# Patient Record
Sex: FEMALE | Race: WHITE | NOT HISPANIC OR LATINO
[De-identification: names, ages, dates, MRNs, and addresses within clinical notes are randomized per-mention and may not be internally consistent; named-entity substitution may affect disease eponyms.]

---
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
## 2019-01-11 ENCOUNTER — MEDICATION RENEWAL (OUTPATIENT)
Age: 19
End: 2019-01-11

## 2019-03-18 ENCOUNTER — APPOINTMENT (OUTPATIENT)
Dept: ENDOCRINOLOGY | Facility: CLINIC | Age: 19
End: 2019-03-18
Payer: COMMERCIAL

## 2019-03-18 VITALS
HEIGHT: 70 IN | SYSTOLIC BLOOD PRESSURE: 120 MMHG | BODY MASS INDEX: 25.95 KG/M2 | DIASTOLIC BLOOD PRESSURE: 80 MMHG | HEART RATE: 104 BPM | OXYGEN SATURATION: 99 % | WEIGHT: 181.25 LBS

## 2019-03-18 LAB
GLUCOSE BLDC GLUCOMTR-MCNC: 108
HBA1C MFR BLD HPLC: 6.3

## 2019-03-18 PROCEDURE — 83036 HEMOGLOBIN GLYCOSYLATED A1C: CPT | Mod: QW

## 2019-03-18 PROCEDURE — 99214 OFFICE O/P EST MOD 30 MIN: CPT | Mod: 25

## 2019-03-18 PROCEDURE — 82962 GLUCOSE BLOOD TEST: CPT

## 2019-03-18 NOTE — ASSESSMENT
[Carbohydrate Consistent Diet] : carbohydrate consistent diet [Long Term Vascular Complications] : long term vascular complications of diabetes [FreeTextEntry1] : 19 y.o. female with h/o Type 1 DM and hyperlipidemia.\par 1. Type 1 DM- Good control with Hba1c of 6.3%. Encouraged carbohydrate consistent diet and exercise. Reviewed blood glucose log and no patterns noted. Will continue current basal bolus regimen with Lantus 31 units daily and Humalog with ICR of 7 and ISF of 70 with target of 130. Encouraged patient to consider CGMS to help obtain more information and for safety. Normal CMP and urine microalb/cr ratio.\par 2. BP is at goal\par 3. Hyperlipidemia- Encouraged low fat diet and exercise. Will monitor for now. \par \par Follow up in 4 months

## 2019-03-18 NOTE — REVIEW OF SYSTEMS
[Negative] : Endocrine [Irregular Menses] : regular menses [Pain/Numbness of Digits] : no pain/numbness of digits [Swelling] : no swelling

## 2019-03-18 NOTE — PHYSICAL EXAM
[Alert] : alert [No Acute Distress] : no acute distress [Normal Sclera/Conjunctiva] : normal sclera/conjunctiva [EOMI] : extra ocular movement intact [Supple] : the neck was supple [Thyroid Not Enlarged] : the thyroid was not enlarged [No LAD] : no lymphadenopathy [No Accessory Muscle Use] : no accessory muscle use [Clear to Auscultation] : lungs were clear to auscultation bilaterally [Regular Rhythm] : with a regular rhythm [Normal S1, S2] : normal S1 and S2 [No Edema] : there was no peripheral edema [Normal Bowel Sounds] : normal bowel sounds [Not Tender] : non-tender [Soft] : abdomen soft [Not Distended] : not distended [No Clubbing, Cyanosis] : no clubbing  or cyanosis of the fingernails [Right Foot Was Examined] : right foot ~C was examined [No Rash] : no rash [Left Foot Was Examined] : left foot ~C was examined [Normal] : normal [2+] : 2+ in the dorsalis pedis [Normal Reflexes] : deep tendon reflexes were 2+ and symmetric [Normal Affect] : the affect was normal [Normal Mood] : the mood was normal [Diminished Throughout Both Feet] : normal tactile sensation with monofilament testing throughout both feet

## 2019-10-04 ENCOUNTER — MEDICATION RENEWAL (OUTPATIENT)
Age: 19
End: 2019-10-04

## 2019-10-04 ENCOUNTER — APPOINTMENT (OUTPATIENT)
Dept: ENDOCRINOLOGY | Facility: CLINIC | Age: 19
End: 2019-10-04
Payer: COMMERCIAL

## 2019-10-04 VITALS
WEIGHT: 175 LBS | SYSTOLIC BLOOD PRESSURE: 118 MMHG | OXYGEN SATURATION: 99 % | HEIGHT: 70 IN | BODY MASS INDEX: 25.05 KG/M2 | HEART RATE: 120 BPM | DIASTOLIC BLOOD PRESSURE: 74 MMHG

## 2019-10-04 LAB
GLUCOSE BLDC GLUCOMTR-MCNC: 96
HBA1C MFR BLD HPLC: 7.3

## 2019-10-04 PROCEDURE — 99214 OFFICE O/P EST MOD 30 MIN: CPT | Mod: 25

## 2019-10-04 PROCEDURE — 82962 GLUCOSE BLOOD TEST: CPT

## 2019-10-04 PROCEDURE — 83036 HEMOGLOBIN GLYCOSYLATED A1C: CPT | Mod: QW

## 2019-10-04 RX ORDER — NORGESTIMATE AND ETHINYL ESTRADIOL 7DAYSX3 LO
0.18/0.215/0.25 KIT ORAL
Qty: 28 | Refills: 0 | Status: DISCONTINUED | COMMUNITY
Start: 2017-04-18 | End: 2019-10-04

## 2019-10-04 RX ORDER — BLOOD-GLUCOSE TRANSMITTER
EACH MISCELLANEOUS
Qty: 1 | Refills: 1 | Status: ACTIVE | COMMUNITY
Start: 2019-10-04

## 2019-10-04 RX ORDER — BLOOD-GLUCOSE SENSOR
EACH MISCELLANEOUS
Qty: 3 | Refills: 3 | Status: ACTIVE | COMMUNITY
Start: 2019-10-04

## 2019-10-04 NOTE — ASSESSMENT
[FreeTextEntry1] : 19 y.o. female with h/o Type 1 DM and hyperlipidemia.\par 1. Type 1 DM- Good control with Hba1c of 7.3%. Encouraged carbohydrate consistent diet and exercise. Reviewed blood glucose log. Will decrease Lantus to 30 units daily and will continue Humalog with ICR of 7 and ISF of 70 with target of 130. Will order CGMS to help obtain more information and for safety. Will check CMP and urine microalb/cr ratio.\par 2. BP is at goal\par 3. Hyperlipidemia- Encouraged low fat diet and exercise. Will monitor for now. Will check lipids. \par \par Follow up in 3 to 4 months [Carbohydrate Consistent Diet] : carbohydrate consistent diet [Long Term Vascular Complications] : long term vascular complications of diabetes

## 2019-10-04 NOTE — PHYSICAL EXAM
[Alert] : alert [Normal Sclera/Conjunctiva] : normal sclera/conjunctiva [No Acute Distress] : no acute distress [EOMI] : extra ocular movement intact [Supple] : the neck was supple [Thyroid Not Enlarged] : the thyroid was not enlarged [No LAD] : no lymphadenopathy [No Accessory Muscle Use] : no accessory muscle use [Clear to Auscultation] : lungs were clear to auscultation bilaterally [Normal S1, S2] : normal S1 and S2 [Regular Rhythm] : with a regular rhythm [No Edema] : there was no peripheral edema [Not Tender] : non-tender [Normal Bowel Sounds] : normal bowel sounds [Soft] : abdomen soft [Not Distended] : not distended [No Rash] : no rash [No Clubbing, Cyanosis] : no clubbing  or cyanosis of the fingernails [Right Foot Was Examined] : right foot ~C was examined [Left Foot Was Examined] : left foot ~C was examined [Normal] : normal [Diminished Throughout Both Feet] : normal tactile sensation with monofilament testing throughout both feet [2+] : 2+ in the dorsalis pedis [Normal Affect] : the affect was normal [Normal Reflexes] : deep tendon reflexes were 2+ and symmetric [Normal Mood] : the mood was normal

## 2019-10-04 NOTE — REVIEW OF SYSTEMS
[Irregular Menses] : regular menses [Joint Pain] : joint pain [Pain/Numbness of Digits] : no pain/numbness of digits [Headache] : headaches [Swelling] : no swelling [Negative] : Psychiatric [FreeTextEntry9] : left knee pain

## 2019-10-19 LAB
25(OH)D3 SERPL-MCNC: 37.3 NG/ML
ALBUMIN SERPL ELPH-MCNC: 4.6 G/DL
ALP BLD-CCNC: 72 U/L
ALT SERPL-CCNC: 8 U/L
ANION GAP SERPL CALC-SCNC: 12 MMOL/L
AST SERPL-CCNC: 11 U/L
BASOPHILS # BLD AUTO: 0.06 K/UL
BASOPHILS NFR BLD AUTO: 0.8 %
BILIRUB SERPL-MCNC: 0.3 MG/DL
BUN SERPL-MCNC: 10 MG/DL
CALCIUM SERPL-MCNC: 9.9 MG/DL
CHLORIDE SERPL-SCNC: 106 MMOL/L
CHOLEST SERPL-MCNC: 176 MG/DL
CHOLEST/HDLC SERPL: 4.6 RATIO
CO2 SERPL-SCNC: 24 MMOL/L
CREAT SERPL-MCNC: 0.65 MG/DL
CREAT SPEC-SCNC: 292 MG/DL
EOSINOPHIL # BLD AUTO: 0.13 K/UL
EOSINOPHIL NFR BLD AUTO: 1.7 %
GLUCOSE SERPL-MCNC: 115 MG/DL
HCT VFR BLD CALC: 42.2 %
HDLC SERPL-MCNC: 38 MG/DL
HGB BLD-MCNC: 13.8 G/DL
IMM GRANULOCYTES NFR BLD AUTO: 0.3 %
LDLC SERPL CALC-MCNC: 112 MG/DL
LYMPHOCYTES # BLD AUTO: 2.54 K/UL
LYMPHOCYTES NFR BLD AUTO: 33 %
MAN DIFF?: NORMAL
MCHC RBC-ENTMCNC: 30.4 PG
MCHC RBC-ENTMCNC: 32.7 GM/DL
MCV RBC AUTO: 93 FL
MICROALBUMIN 24H UR DL<=1MG/L-MCNC: <1.2 MG/DL
MICROALBUMIN/CREAT 24H UR-RTO: NORMAL MG/G
MONOCYTES # BLD AUTO: 0.51 K/UL
MONOCYTES NFR BLD AUTO: 6.6 %
NEUTROPHILS # BLD AUTO: 4.44 K/UL
NEUTROPHILS NFR BLD AUTO: 57.6 %
PLATELET # BLD AUTO: 276 K/UL
POTASSIUM SERPL-SCNC: 4.1 MMOL/L
PROT SERPL-MCNC: 7.2 G/DL
RBC # BLD: 4.54 M/UL
RBC # FLD: 12.7 %
SODIUM SERPL-SCNC: 142 MMOL/L
T4 FREE SERPL-MCNC: 1.2 NG/DL
TRIGL SERPL-MCNC: 132 MG/DL
TSH SERPL-ACNC: 2.56 UIU/ML
WBC # FLD AUTO: 7.7 K/UL

## 2020-01-31 ENCOUNTER — RX RENEWAL (OUTPATIENT)
Age: 20
End: 2020-01-31

## 2020-02-28 ENCOUNTER — APPOINTMENT (OUTPATIENT)
Dept: ENDOCRINOLOGY | Facility: CLINIC | Age: 20
End: 2020-02-28

## 2020-06-24 ENCOUNTER — APPOINTMENT (OUTPATIENT)
Dept: ENDOCRINOLOGY | Facility: CLINIC | Age: 20
End: 2020-06-24
Payer: COMMERCIAL

## 2020-06-24 VITALS
OXYGEN SATURATION: 99 % | SYSTOLIC BLOOD PRESSURE: 120 MMHG | HEIGHT: 70 IN | TEMPERATURE: 98.3 F | BODY MASS INDEX: 25.48 KG/M2 | WEIGHT: 178 LBS | DIASTOLIC BLOOD PRESSURE: 80 MMHG | HEART RATE: 111 BPM

## 2020-06-24 LAB
GLUCOSE BLDC GLUCOMTR-MCNC: 123
HBA1C MFR BLD HPLC: 6.4

## 2020-06-24 PROCEDURE — 83036 HEMOGLOBIN GLYCOSYLATED A1C: CPT | Mod: QW

## 2020-06-24 PROCEDURE — 82962 GLUCOSE BLOOD TEST: CPT

## 2020-06-24 PROCEDURE — 99214 OFFICE O/P EST MOD 30 MIN: CPT

## 2020-06-24 NOTE — ASSESSMENT
[FreeTextEntry1] : 20 y.o. female with h/o Type 1 DM and hyperlipidemia.\par 1. Type 1 DM- Good control with Hba1c of 6.4% today. Encouraged carbohydrate consistent diet and exercise. Reviewed blood glucose log. Will continue Lantus 30 units daily and will continue Humalog with ICR of 7 and ISF of 70 with target of 130. Encouraged use of CGMS to help obtain more information and for safety. Normal CMP and urine microalb/cr ratio.\par 2. BP is at goal\par 3. Hyperlipidemia- Encouraged low fat diet and exercise. Will monitor for now.\par \par Follow up in 3 to 4 months [Carbohydrate Consistent Diet] : carbohydrate consistent diet [Long Term Vascular Complications] : long term vascular complications of diabetes

## 2020-06-24 NOTE — PHYSICAL EXAM
[Alert] : alert [Normal Sclera/Conjunctiva] : normal sclera/conjunctiva [No Acute Distress] : no acute distress [No LAD] : no lymphadenopathy [EOMI] : extra ocular movement intact [No Respiratory Distress] : no respiratory distress [Thyroid Not Enlarged] : the thyroid was not enlarged [No Thyroid Nodules] : no palpable thyroid nodules [Normal S1, S2] : normal S1 and S2 [Clear to Auscultation] : lungs were clear to auscultation bilaterally [Regular Rhythm] : with a regular rhythm [No Edema] : no peripheral edema [Normal Bowel Sounds] : normal bowel sounds [Not Tender] : non-tender [Not Distended] : not distended [Soft] : abdomen soft [Normal Anterior Cervical Nodes] : no anterior cervical lymphadenopathy [No Clubbing, Cyanosis] : no clubbing  or cyanosis of the fingernails [No Rash] : no rash [Right Foot Was Examined] : right foot ~C was examined [Left Foot Was Examined] : left foot ~C was examined [2+] : 2+ in the dorsalis pedis [Normal] : normal [Diminished Throughout Both Feet] : normal tactile sensation with monofilament testing throughout both feet [Normal Mood] : the mood was normal [Normal Affect] : the affect was normal [Normal Reflexes] : deep tendon reflexes were 2+ and symmetric

## 2020-06-24 NOTE — REVIEW OF SYSTEMS
[Recent Weight Gain (___ Lbs)] : no recent weight gain [Recent Weight Loss (___ Lbs)] : no recent weight loss [Fatigue] : no fatigue [Heartburn] : heartburn [Polyuria] : no polyuria [Irregular Menses] : regular menses [Polydipsia] : no polydipsia [Pain/Numbness of Digits] : no pain/numbness of digits [Swelling] : no swelling [Negative] : Integumentary

## 2020-07-22 ENCOUNTER — APPOINTMENT (OUTPATIENT)
Dept: OPHTHALMOLOGY | Facility: CLINIC | Age: 20
End: 2020-07-22
Payer: COMMERCIAL

## 2020-07-22 ENCOUNTER — NON-APPOINTMENT (OUTPATIENT)
Age: 20
End: 2020-07-22

## 2020-07-22 PROCEDURE — 92014 COMPRE OPH EXAM EST PT 1/>: CPT

## 2020-10-16 ENCOUNTER — APPOINTMENT (OUTPATIENT)
Dept: ENDOCRINOLOGY | Facility: CLINIC | Age: 20
End: 2020-10-16

## 2021-03-19 ENCOUNTER — APPOINTMENT (OUTPATIENT)
Dept: ENDOCRINOLOGY | Facility: CLINIC | Age: 21
End: 2021-03-19
Payer: COMMERCIAL

## 2021-03-19 VITALS
BODY MASS INDEX: 25.77 KG/M2 | HEART RATE: 119 BPM | SYSTOLIC BLOOD PRESSURE: 130 MMHG | WEIGHT: 180 LBS | TEMPERATURE: 96.5 F | HEIGHT: 70 IN | OXYGEN SATURATION: 98 % | DIASTOLIC BLOOD PRESSURE: 90 MMHG

## 2021-03-19 LAB
GLUCOSE BLDC GLUCOMTR-MCNC: 71
HBA1C MFR BLD HPLC: 5.6

## 2021-03-19 PROCEDURE — 83036 HEMOGLOBIN GLYCOSYLATED A1C: CPT | Mod: QW

## 2021-03-19 PROCEDURE — 99072 ADDL SUPL MATRL&STAF TM PHE: CPT

## 2021-03-19 PROCEDURE — 99214 OFFICE O/P EST MOD 30 MIN: CPT | Mod: 25

## 2021-03-19 PROCEDURE — 82962 GLUCOSE BLOOD TEST: CPT

## 2021-03-19 RX ORDER — LEVOTHYROXINE SODIUM 0.05 MG/1
50 TABLET ORAL DAILY
Qty: 90 | Refills: 3 | Status: ACTIVE | COMMUNITY
Start: 2021-03-19

## 2021-03-21 NOTE — ASSESSMENT
[Carbohydrate Consistent Diet] : carbohydrate consistent diet [Long Term Vascular Complications] : long term vascular complications of diabetes [FreeTextEntry1] : 21 y.o. female with h/o Type 1 DM, hyperlipidemia and hypothyroidism.\par \par 1. Type 1 DM- Good control with Hba1c of 5.6% today. However need to avoid hypoglycemia. Encouraged carbohydrate consistent diet and exercise. Reviewed blood glucose log. Will continue Lantus 31 units daily and will continue Humalog with ICR of 7 and ISF of 70 with target of 130. Encouraged use of CGMS to help obtain more information and for safety. WIll check CMP and urine microalb/cr ratio.\par \par 2. BP is at goal\par \par 3. Hyperlipidemia- Encouraged low fat diet and exercise. Will monitor for now. Will check lipids.\par \par 4. Hypothyroidism- Discussed pathophysiology. Will check TFTs and antithyroid antibodies. Will adjust LT4 accordingly.\par \par Follow up in 3 to 4 months [Levothyroxine] : The patient was instructed to take Levothyroxine on an empty stomach, separate from vitamins, and wait at least 30 minutes before eating

## 2021-03-21 NOTE — REVIEW OF SYSTEMS
[Fatigue] : no fatigue [Recent Weight Gain (___ Lbs)] : no recent weight gain [Recent Weight Loss (___ Lbs)] : no recent weight loss [Polyuria] : no polyuria [Irregular Menses] : regular menses [Pain/Numbness of Digits] : no pain/numbness of digits [Polydipsia] : no polydipsia [Swelling] : no swelling [Negative] : Psychiatric

## 2021-03-21 NOTE — PHYSICAL EXAM
[Alert] : alert [No Acute Distress] : no acute distress [Normal Sclera/Conjunctiva] : normal sclera/conjunctiva [EOMI] : extra ocular movement intact [No LAD] : no lymphadenopathy [Thyroid Not Enlarged] : the thyroid was not enlarged [No Thyroid Nodules] : no palpable thyroid nodules [No Respiratory Distress] : no respiratory distress [Clear to Auscultation] : lungs were clear to auscultation bilaterally [Normal S1, S2] : normal S1 and S2 [Regular Rhythm] : with a regular rhythm [No Edema] : no peripheral edema [Normal Bowel Sounds] : normal bowel sounds [Not Tender] : non-tender [Not Distended] : not distended [Soft] : abdomen soft [Normal Anterior Cervical Nodes] : no anterior cervical lymphadenopathy [No Clubbing, Cyanosis] : no clubbing  or cyanosis of the fingernails [No Rash] : no rash [Right Foot Was Examined] : right foot ~C was examined [Left Foot Was Examined] : left foot ~C was examined [Normal] : normal [2+] : 2+ in the dorsalis pedis [Normal Reflexes] : deep tendon reflexes were 2+ and symmetric [Normal Affect] : the affect was normal [Normal Mood] : the mood was normal [Diminished Throughout Both Feet] : normal tactile sensation with monofilament testing throughout both feet

## 2021-09-24 ENCOUNTER — TRANSCRIPTION ENCOUNTER (OUTPATIENT)
Age: 21
End: 2021-09-24

## 2021-09-24 ENCOUNTER — APPOINTMENT (OUTPATIENT)
Dept: OPHTHALMOLOGY | Facility: CLINIC | Age: 21
End: 2021-09-24
Payer: COMMERCIAL

## 2021-09-24 ENCOUNTER — NON-APPOINTMENT (OUTPATIENT)
Age: 21
End: 2021-09-24

## 2021-09-24 PROCEDURE — 92014 COMPRE OPH EXAM EST PT 1/>: CPT

## 2021-12-22 ENCOUNTER — NON-APPOINTMENT (OUTPATIENT)
Age: 21
End: 2021-12-22

## 2021-12-22 ENCOUNTER — APPOINTMENT (OUTPATIENT)
Dept: OPHTHALMOLOGY | Facility: CLINIC | Age: 21
End: 2021-12-22
Payer: COMMERCIAL

## 2021-12-22 PROCEDURE — 92012 INTRM OPH EXAM EST PATIENT: CPT

## 2021-12-22 PROCEDURE — 92083 EXTENDED VISUAL FIELD XM: CPT

## 2021-12-22 PROCEDURE — 92133 CPTRZD OPH DX IMG PST SGM ON: CPT

## 2022-02-07 ENCOUNTER — APPOINTMENT (OUTPATIENT)
Dept: ENDOCRINOLOGY | Facility: CLINIC | Age: 22
End: 2022-02-07
Payer: COMMERCIAL

## 2022-02-07 VITALS — DIASTOLIC BLOOD PRESSURE: 70 MMHG | SYSTOLIC BLOOD PRESSURE: 120 MMHG

## 2022-02-07 VITALS
BODY MASS INDEX: 22.62 KG/M2 | SYSTOLIC BLOOD PRESSURE: 148 MMHG | WEIGHT: 158 LBS | HEART RATE: 93 BPM | DIASTOLIC BLOOD PRESSURE: 83 MMHG | TEMPERATURE: 98.2 F | OXYGEN SATURATION: 100 % | HEIGHT: 70 IN

## 2022-02-07 DIAGNOSIS — Z87.39 PERSONAL HISTORY OF OTHER DISEASES OF THE MUSCULOSKELETAL SYSTEM AND CONNECTIVE TISSUE: ICD-10-CM

## 2022-02-07 DIAGNOSIS — N92.6 IRREGULAR MENSTRUATION, UNSPECIFIED: ICD-10-CM

## 2022-02-07 DIAGNOSIS — E78.5 HYPERLIPIDEMIA, UNSPECIFIED: ICD-10-CM

## 2022-02-07 LAB — HBA1C MFR BLD HPLC: 6.8

## 2022-02-07 PROCEDURE — 36415 COLL VENOUS BLD VENIPUNCTURE: CPT

## 2022-02-07 PROCEDURE — 83036 HEMOGLOBIN GLYCOSYLATED A1C: CPT | Mod: QW

## 2022-02-07 PROCEDURE — 99214 OFFICE O/P EST MOD 30 MIN: CPT | Mod: 25

## 2022-02-07 RX ORDER — CEFADROXIL 500 MG/1
500 CAPSULE ORAL
Qty: 10 | Refills: 0 | Status: DISCONTINUED | COMMUNITY
Start: 2022-01-27

## 2022-02-07 RX ORDER — NORETHINDRONE ACETATE AND ETHINYL ESTRADIOL, ETHINYL ESTRADIOL AND FERROUS FUMARATE 1MG-10(24)
1 MG-10 MCG / KIT ORAL
Refills: 0 | Status: DISCONTINUED | COMMUNITY
Start: 2019-10-04 | End: 2022-02-07

## 2022-02-07 RX ORDER — SCOPOLAMINE 1.5 MG/1
1 PATCH, EXTENDED RELEASE TRANSDERMAL
Qty: 1 | Refills: 0 | Status: DISCONTINUED | COMMUNITY
Start: 2022-01-27

## 2022-02-07 RX ORDER — METHOCARBAMOL 750 MG/1
750 TABLET, FILM COATED ORAL
Qty: 20 | Refills: 0 | Status: DISCONTINUED | COMMUNITY
Start: 2021-11-17

## 2022-02-07 RX ORDER — OXYCODONE HYDROCHLORIDE 5 MG/1
5 CAPSULE ORAL
Qty: 30 | Refills: 0 | Status: DISCONTINUED | COMMUNITY
Start: 2022-01-27

## 2022-02-07 RX ORDER — OXYCODONE 5 MG/1
5 TABLET ORAL
Qty: 24 | Refills: 0 | Status: DISCONTINUED | COMMUNITY
Start: 2022-01-18

## 2022-02-07 RX ORDER — DICLOFENAC SODIUM 100 MG/1
100 TABLET, FILM COATED, EXTENDED RELEASE ORAL
Qty: 30 | Refills: 0 | Status: DISCONTINUED | COMMUNITY
Start: 2021-11-17

## 2022-02-07 NOTE — REVIEW OF SYSTEMS
[Recent Weight Loss (___ Lbs)] : recent weight loss: [unfilled] lbs [Negative] : Psychiatric [Hirsutism] : hirsutism [Fatigue] : no fatigue [Recent Weight Gain (___ Lbs)] : no recent weight gain [Polyuria] : no polyuria [Irregular Menses] : regular menses [Pain/Numbness of Digits] : no pain/numbness of digits [Polydipsia] : no polydipsia [Swelling] : no swelling

## 2022-02-07 NOTE — ASSESSMENT
[Carbohydrate Consistent Diet] : carbohydrate consistent diet [Long Term Vascular Complications] : long term vascular complications of diabetes [Levothyroxine] : The patient was instructed to take Levothyroxine on an empty stomach, separate from vitamins, and wait at least 30 minutes before eating [FreeTextEntry1] : 21 y.o. female with h/o Type 1 DM, hyperlipidemia and hypothyroidism.\par \par 1. Type 1 DM- Good control with Hba1c of 6.8% today. However need to avoid hypoglycemia. Encouraged carbohydrate consistent diet and exercise. Reviewed blood glucose log. Will continue Lantus/Basglar 32 units daily and will continue Humalog with ICR of 7 and ISF of 70 with target of 130. Encouraged use of CGMS to help obtain more information and for safety. WIll check CMP and urine alb/cr ratio.\par \par 2. BP is at goal\par \par 3. Hyperlipidemia- Encouraged low fat diet and exercise. Will monitor for now. Will check lipids.\par \par 4. Hypothyroidism- Discussed pathophysiology. Will check TFTs and antithyroid antibodies. Will adjust LT4 accordingly.\par \par 5. Irregular menses- Will evaluate for PCOS given hirsutism. Will check serum prolactin, LH/FSH, estradiol, testosterone and DHEAS. Agree with GYN follow up\par \par Follow up in 3 to 4 months\par Labs drawn in the office today

## 2022-02-08 ENCOUNTER — APPOINTMENT (OUTPATIENT)
Dept: OPHTHALMOLOGY | Facility: CLINIC | Age: 22
End: 2022-02-08
Payer: COMMERCIAL

## 2022-02-08 ENCOUNTER — NON-APPOINTMENT (OUTPATIENT)
Age: 22
End: 2022-02-08

## 2022-02-08 PROCEDURE — 92133 CPTRZD OPH DX IMG PST SGM ON: CPT

## 2022-02-08 PROCEDURE — 92012 INTRM OPH EXAM EST PATIENT: CPT

## 2022-02-08 PROCEDURE — 92083 EXTENDED VISUAL FIELD XM: CPT

## 2022-02-09 LAB
25(OH)D3 SERPL-MCNC: 41.3 NG/ML
ALBUMIN SERPL ELPH-MCNC: 4.6 G/DL
ALP BLD-CCNC: 85 U/L
ALT SERPL-CCNC: 32 U/L
ANION GAP SERPL CALC-SCNC: 12 MMOL/L
AST SERPL-CCNC: 13 U/L
BASOPHILS # BLD AUTO: 0.06 K/UL
BASOPHILS NFR BLD AUTO: 0.8 %
BILIRUB SERPL-MCNC: 0.3 MG/DL
BUN SERPL-MCNC: 10 MG/DL
CALCIUM SERPL-MCNC: 9.9 MG/DL
CHLORIDE SERPL-SCNC: 103 MMOL/L
CHOLEST SERPL-MCNC: 192 MG/DL
CO2 SERPL-SCNC: 27 MMOL/L
CREAT SERPL-MCNC: 0.52 MG/DL
CREAT SPEC-SCNC: 19 MG/DL
DHEA-S SERPL-MCNC: 454 UG/DL
EOSINOPHIL # BLD AUTO: 0.08 K/UL
EOSINOPHIL NFR BLD AUTO: 1 %
ESTRADIOL SERPL-MCNC: 40 PG/ML
FSH SERPL-MCNC: 7.4 IU/L
GLUCOSE SERPL-MCNC: 134 MG/DL
HCT VFR BLD CALC: 39.6 %
HDLC SERPL-MCNC: 48 MG/DL
HGB BLD-MCNC: 13 G/DL
IMM GRANULOCYTES NFR BLD AUTO: 0.6 %
LDLC SERPL CALC-MCNC: 132 MG/DL
LH SERPL-ACNC: 17.3 IU/L
LYMPHOCYTES # BLD AUTO: 2.8 K/UL
LYMPHOCYTES NFR BLD AUTO: 35.2 %
MAN DIFF?: NORMAL
MCHC RBC-ENTMCNC: 30 PG
MCHC RBC-ENTMCNC: 32.8 GM/DL
MCV RBC AUTO: 91.2 FL
MICROALBUMIN 24H UR DL<=1MG/L-MCNC: <1.2 MG/DL
MICROALBUMIN/CREAT 24H UR-RTO: NORMAL MG/G
MONOCYTES # BLD AUTO: 0.49 K/UL
MONOCYTES NFR BLD AUTO: 6.2 %
NEUTROPHILS # BLD AUTO: 4.47 K/UL
NEUTROPHILS NFR BLD AUTO: 56.2 %
NONHDLC SERPL-MCNC: 144 MG/DL
PLATELET # BLD AUTO: 346 K/UL
POTASSIUM SERPL-SCNC: 4.3 MMOL/L
PROLACTIN SERPL-MCNC: 16.9 NG/ML
PROT SERPL-MCNC: 7.1 G/DL
RBC # BLD: 4.34 M/UL
RBC # FLD: 12.6 %
SODIUM SERPL-SCNC: 142 MMOL/L
T4 FREE SERPL-MCNC: 1.2 NG/DL
TESTOST SERPL-MCNC: 57.7 NG/DL
THYROGLOB AB SERPL-ACNC: <20 IU/ML
THYROPEROXIDASE AB SERPL IA-ACNC: 75.3 IU/ML
TRIGL SERPL-MCNC: 60 MG/DL
TSH SERPL-ACNC: 1.21 UIU/ML
TTG IGA SER IA-ACNC: <1.2 U/ML
TTG IGA SER-ACNC: NEGATIVE
TTG IGG SER IA-ACNC: <1.2 U/ML
TTG IGG SER IA-ACNC: NEGATIVE
WBC # FLD AUTO: 7.95 K/UL

## 2022-03-30 ENCOUNTER — RX RENEWAL (OUTPATIENT)
Age: 22
End: 2022-03-30

## 2022-07-15 ENCOUNTER — APPOINTMENT (OUTPATIENT)
Dept: ENDOCRINOLOGY | Facility: CLINIC | Age: 22
End: 2022-07-15

## 2022-10-31 ENCOUNTER — APPOINTMENT (OUTPATIENT)
Dept: ENDOCRINOLOGY | Facility: CLINIC | Age: 22
End: 2022-10-31

## 2023-05-10 ENCOUNTER — APPOINTMENT (OUTPATIENT)
Dept: ENDOCRINOLOGY | Facility: CLINIC | Age: 23
End: 2023-05-10
Payer: COMMERCIAL

## 2023-05-10 VITALS
SYSTOLIC BLOOD PRESSURE: 132 MMHG | BODY MASS INDEX: 23.48 KG/M2 | OXYGEN SATURATION: 99 % | HEIGHT: 70 IN | DIASTOLIC BLOOD PRESSURE: 80 MMHG | WEIGHT: 164 LBS | HEART RATE: 115 BPM

## 2023-05-10 DIAGNOSIS — C73 MALIGNANT NEOPLASM OF THYROID GLAND: ICD-10-CM

## 2023-05-10 DIAGNOSIS — E03.9 HYPOTHYROIDISM, UNSPECIFIED: ICD-10-CM

## 2023-05-10 DIAGNOSIS — E10.9 TYPE 1 DIABETES MELLITUS W/OUT COMPLICATIONS: ICD-10-CM

## 2023-05-10 PROCEDURE — 99214 OFFICE O/P EST MOD 30 MIN: CPT

## 2023-05-10 RX ORDER — INSULIN GLARGINE 300 U/ML
300 INJECTION, SOLUTION SUBCUTANEOUS
Qty: 3 | Refills: 3 | Status: ACTIVE | COMMUNITY
Start: 2022-03-14 | End: 1900-01-01

## 2023-05-10 RX ORDER — INSULIN GLARGINE 100 [IU]/ML
100 INJECTION, SOLUTION SUBCUTANEOUS
Qty: 30 | Refills: 10 | Status: DISCONTINUED | COMMUNITY
Start: 2020-01-31 | End: 2023-05-10

## 2023-05-10 NOTE — REVIEW OF SYSTEMS
[Recent Weight Loss (___ Lbs)] : recent weight loss: [unfilled] lbs [As Noted in HPI] : as noted in HPI [Irregular Menses] : irregular menses [Negative] : Heme/Lymph [Fatigue] : no fatigue [Recent Weight Gain (___ Lbs)] : no recent weight gain [Eye Pain] : no pain [Blurred Vision] : no blurred vision [Dysphagia] : no dysphagia [Neck Pain] : no neck pain [Dysphonia] : no dysphonia [Chest Pain] : no chest pain [Palpitations] : no palpitations [Shortness Of Breath] : no shortness of breath [Nausea] : no nausea [Constipation] : no constipation [Abdominal Pain] : no abdominal pain [Vomiting] : no vomiting [Polyuria] : no polyuria [Tremors] : no tremors [Pain/Numbness of Digits] : no pain/numbness of digits [Polydipsia] : no polydipsia [Cold Intolerance] : no cold intolerance [Heat Intolerance] : no heat intolerance [Swelling] : no swelling

## 2023-05-10 NOTE — HISTORY OF PRESENT ILLNESS
[FreeTextEntry1] : 23 y.o. female with h/o Type 1 DM diagnosed in 2006 here for follow up visit. Last seen by Dr. Godinez 2/2022. 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 Was recently diagnosed with papillary thyroid cancer while in [place]. States she was having some throat discomfort, went to endocrinologist who found multiple thyroid nodules. A FNA biopsy was performed which showed papillary thyroid cancer, had right thyroidectomy 3/30/2023 and removal of 4 lymph nodes (states had mets to 2 lymph nodes). Surgery was performed in [place]. Had appointment with [facility] (Dr. Lizeth Kramer) and was advised to follow up at [facility] in 1 year. She is returning back to [place] in 8 days, she has follows up set up with endocrinology in [place]. Has scheduled repeat thyroid US in 2 months. Currently she denies any compressive neck symptoms: no neck swelling, neck pain, dysphagia, or shortness of breath.\par \par Hx of Hashimoto's, diagnosed with hypothyroidism in 11/2020 after seeing PCP for weight gain, constipation and hair loss. Currently taking LT4 50 mcg every morning on an empty stomach and waits an hour before eating. Reports she had gained weight prior to her thyroid surgery, now actively trying to lose weight, lost 8 pounds in last 3 months by walking daily and eating healthier. Denies increased fatigue. Denies heat/cold intolerance. Denies constipation or diarrhea. Denies palpitations, restlessness, or increased anxiety. States she has had irregular menses for many years, will go few months without her menstrual cycle. Recently saw [facility], started junel fe birth control.\par \par Had blood work done yesterday: \par TSH: 3.51, total T4: 10.8, and T3: 1.5\par A1c: 6.8%\par \par Type 1 DM hx: \par Did use CSII 6 to 7 years ago and not happy and using MDI now. Has been using Dexcom G6 on and off because of skin irritation and able to FS monitoring. Checks FS 5 to 6 times per day. Taking Toujeo 28 (was previously taking 32 units at night but was having AM hypoglycemia so reduced Toujeo to 28 units) and using Humalog with ICR of 7 with ISF of 70 and target of 130. No major hypoglycemia. Reports she has ketone strips, needs glucagon refill. Has medic ID but not wearing it currently. Also needing refill on her Dexcom supplies. Last saw optho 8/2022, denies retinopathy. Denies neuropathy or active issues with her feet. No polyuria and no polydipsia. No proteinuria on last check. \par \par No DKA since last visit. Uses urine ketone strips if needed. Walks daily for exercise. Did lose weight and modified diet. \par

## 2023-05-10 NOTE — ASSESSMENT
[Carbohydrate Consistent Diet] : carbohydrate consistent diet [Long Term Vascular Complications] : long term vascular complications of diabetes [Levothyroxine] : The patient was instructed to take Levothyroxine on an empty stomach, separate from vitamins, and wait at least 30 minutes before eating [FreeTextEntry1] : 23 y.o. female with h/o Type 1 DM diagnosed in 2006 here for follow up visit\par \par Type 1 DM\par Hba1c of 6.8% (obtained 5/9/2023 with PCP), Encouraged carbohydrate consistent diet and exercise. Reviewed blood glucose log. BG data variable. Will continue Toujeo 28 units daily and will continue Humalog with ICR of 7 and ISF of 70 with target of 130. Encouraged use of CGMS to help obtain more information and for safety. Keep log of BG and food/carb intake, email to our office in 2 weeks. Had recent blood work and urine albumin/creatinine ratio done with PCP, will attempt to obtain. \par \par Thyroid cancer and Hypothyroidism \par Recently diagnosed with papillary thyroid cancer while in [facility] s/p right thyroidectomy 3/30/2023 and removal of 4 lymph nodes (states had mets to 2 lymph nodes). Had recent appointment with [facility] (Dr. Lizeth Kramer). She is returning back to [place] in a few days, she has follow ups set up with endocrinology in [place]. 5/9/2023 TSH: 3.51, total T4: 10.8, and T3: 1.5. Discussed case and TFT's with Dr. Godinez, will likely need more TSH suppression with increase dose of Levothyroxine, however, given patient returning to [place] in few days, recommend patient to follow up with established endocrinology team there and discuss results and further recommendations regarding TSH goals and Levothyroxine dose. Currently do not have access to pathology results and follow up notes, will attempt to obtain from [facility] (patient states she provided [facility] with US, FNA, and pathology results). \par \par Hyperlipidemia\par Encouraged low fat diet and exercise. Will monitor for now. Had recent blood work with PCP performed, will attempt to obtain \par \par Irregular menses\par 2/2022 consistent with PCOS. Saw GYN was started on Junel Fe.\par \par Recommend 3 month follow up, states she will be in [place], has follow up with Dr. Godinez 12/2023. Call office with any concerns. \par \par Indigo Sotomayor PA-C\par [facility] \par

## 2023-05-10 NOTE — PHYSICAL EXAM
[Alert] : alert [No Acute Distress] : no acute distress [Normal Sclera/Conjunctiva] : normal sclera/conjunctiva [Thyroid Not Enlarged] : the thyroid was not enlarged [No Respiratory Distress] : no respiratory distress [Clear to Auscultation] : lungs were clear to auscultation bilaterally [Normal S1, S2] : normal S1 and S2 [Regular Rhythm] : with a regular rhythm [No Edema] : no peripheral edema [Normal Bowel Sounds] : normal bowel sounds [Not Tender] : non-tender [Not Distended] : not distended [Soft] : abdomen soft [No Rash] : no rash [Normal] : normal [2+] : 2+ in the dorsalis pedis [Normal Affect] : the affect was normal [Well Nourished] : well nourished [Well Developed] : well developed [No Proptosis] : no proptosis [Supple] : the neck was supple [Well Healed Scar] : well healed scar [No Accessory Muscle Use] : no accessory muscle use [Normal Rate and Effort] : normal respiratory rate and effort [Normal Rate] : heart rate was normal [No Tremors] : no tremors [Normal Sensation on Monofilament Testing] : normal sensation on monofilament testing of lower extremities [Oriented x3] : oriented to person, place, and time [Acanthosis Nigricans] : no acanthosis nigricans [Diminished Throughout Both Feet] : normal tactile sensation with monofilament testing throughout both feet [de-identified] : no palpable thyroid nodules  [de-identified] : foot exam performed 5/10/2023

## 2023-12-22 ENCOUNTER — APPOINTMENT (OUTPATIENT)
Dept: ENDOCRINOLOGY | Facility: CLINIC | Age: 23
End: 2023-12-22

## 2023-12-27 ENCOUNTER — NON-APPOINTMENT (OUTPATIENT)
Age: 23
End: 2023-12-27

## 2023-12-27 ENCOUNTER — APPOINTMENT (OUTPATIENT)
Dept: OPHTHALMOLOGY | Facility: CLINIC | Age: 23
End: 2023-12-27
Payer: COMMERCIAL

## 2023-12-27 PROCEDURE — 92015 DETERMINE REFRACTIVE STATE: CPT

## 2023-12-27 PROCEDURE — 92014 COMPRE OPH EXAM EST PT 1/>: CPT

## 2024-02-28 LAB — TSH SERPL-ACNC: 3.51

## 2025-04-30 ENCOUNTER — APPOINTMENT (OUTPATIENT)
Dept: ENDOCRINOLOGY | Facility: CLINIC | Age: 25
End: 2025-04-30

## 2025-08-05 ENCOUNTER — APPOINTMENT (OUTPATIENT)
Dept: ENDOCRINOLOGY | Facility: CLINIC | Age: 25
End: 2025-08-05

## 2025-08-22 ENCOUNTER — APPOINTMENT (OUTPATIENT)
Dept: OPHTHALMOLOGY | Facility: CLINIC | Age: 25
End: 2025-08-22